# Patient Record
Sex: MALE | URBAN - METROPOLITAN AREA
[De-identification: names, ages, dates, MRNs, and addresses within clinical notes are randomized per-mention and may not be internally consistent; named-entity substitution may affect disease eponyms.]

---

## 2023-05-03 ENCOUNTER — APPOINTMENT (RX ONLY)
Dept: URBAN - METROPOLITAN AREA OTHER 11 | Facility: OTHER | Age: 63
Setting detail: DERMATOLOGY
End: 2023-05-03

## 2023-05-03 DIAGNOSIS — L91.8 OTHER HYPERTROPHIC DISORDERS OF THE SKIN: ICD-10-CM

## 2023-05-03 DIAGNOSIS — L82.1 OTHER SEBORRHEIC KERATOSIS: ICD-10-CM

## 2023-05-03 DIAGNOSIS — D22 MELANOCYTIC NEVI: ICD-10-CM

## 2023-05-03 DIAGNOSIS — D18.0 HEMANGIOMA: ICD-10-CM

## 2023-05-03 PROBLEM — D22.5 MELANOCYTIC NEVI OF TRUNK: Status: ACTIVE | Noted: 2023-05-03

## 2023-05-03 PROBLEM — D22.39 MELANOCYTIC NEVI OF OTHER PARTS OF FACE: Status: ACTIVE | Noted: 2023-05-03

## 2023-05-03 PROBLEM — D18.01 HEMANGIOMA OF SKIN AND SUBCUTANEOUS TISSUE: Status: ACTIVE | Noted: 2023-05-03

## 2023-05-03 PROCEDURE — ? TREATMENT REGIMEN

## 2023-05-03 PROCEDURE — 99203 OFFICE O/P NEW LOW 30 MIN: CPT

## 2023-05-03 PROCEDURE — ? COUNSELING

## 2023-05-03 ASSESSMENT — LOCATION SIMPLE DESCRIPTION DERM
LOCATION SIMPLE: LEFT ANTERIOR NECK
LOCATION SIMPLE: ABDOMEN
LOCATION SIMPLE: LEFT FOREHEAD
LOCATION SIMPLE: RIGHT UPPER BACK

## 2023-05-03 ASSESSMENT — LOCATION DETAILED DESCRIPTION DERM
LOCATION DETAILED: LEFT CLAVICULAR NECK
LOCATION DETAILED: RIGHT MID-UPPER BACK
LOCATION DETAILED: LEFT LATERAL FOREHEAD
LOCATION DETAILED: PERIUMBILICAL SKIN
LOCATION DETAILED: LEFT RIB CAGE
LOCATION DETAILED: EPIGASTRIC SKIN

## 2023-05-03 ASSESSMENT — LOCATION ZONE DERM
LOCATION ZONE: TRUNK
LOCATION ZONE: FACE
LOCATION ZONE: NECK

## 2023-05-03 NOTE — PROCEDURE: TREATMENT REGIMEN
Detail Level: Zone
Plan: Patient quoted at $125 for removal. He will consider it and return in future if he would like to have lesions treated.